# Patient Record
Sex: FEMALE | Race: WHITE | NOT HISPANIC OR LATINO | URBAN - NONMETROPOLITAN AREA
[De-identification: names, ages, dates, MRNs, and addresses within clinical notes are randomized per-mention and may not be internally consistent; named-entity substitution may affect disease eponyms.]

---

## 2017-03-08 PROBLEM — H52.4: Noted: 2017-03-08

## 2017-03-08 PROBLEM — H52.13: Noted: 2017-03-08

## 2019-04-03 ENCOUNTER — IMPORTED ENCOUNTER (OUTPATIENT)
Dept: URBAN - NONMETROPOLITAN AREA CLINIC 1 | Facility: CLINIC | Age: 49
End: 2019-04-03

## 2019-04-03 PROCEDURE — S0621 ROUTINE OPHTHALMOLOGICAL EXA: HCPCS

## 2019-04-03 PROCEDURE — 92310 CONTACT LENS FITTING OU: CPT

## 2019-04-03 NOTE — PATIENT DISCUSSION
Mylene Jeans /Sara OU- Discussed refractive status with patient- New glasses and CL Rx given today - Discussed with patient LASIK procedure and options- Continue to monitor- RTC 1 year complete; 's Notes: MR 4/3/19

## 2020-08-12 ENCOUNTER — IMPORTED ENCOUNTER (OUTPATIENT)
Dept: URBAN - NONMETROPOLITAN AREA CLINIC 1 | Facility: CLINIC | Age: 50
End: 2020-08-12

## 2020-08-12 PROCEDURE — 92310 CONTACT LENS FITTING OU: CPT

## 2020-08-12 PROCEDURE — S0621 ROUTINE OPHTHALMOLOGICAL EXA: HCPCS

## 2020-08-12 PROCEDURE — V2520 CONTACT LENS HYDROPHILIC: HCPCS

## 2020-08-12 NOTE — PATIENT DISCUSSION
Abram Tanner /Sara OU- Discussed refractive status with patient- New glasses and CL Rx given today - Discussed with patient LASIK procedure and options- Continue to monitor- RTC 1 year complete; 's Notes: MR 4/3/19

## 2021-09-30 ENCOUNTER — IMPORTED ENCOUNTER (OUTPATIENT)
Dept: URBAN - NONMETROPOLITAN AREA CLINIC 1 | Facility: CLINIC | Age: 51
End: 2021-09-30

## 2021-09-30 PROCEDURE — S0621 ROUTINE OPHTHALMOLOGICAL EXA: HCPCS

## 2021-09-30 PROCEDURE — 92310 CONTACT LENS FITTING OU: CPT

## 2021-09-30 NOTE — PATIENT DISCUSSION
Jacque Seed /Myoipa OU- Discussed refractive status with patient- New glasses and CL Rx given today - Continue to monitor- RTC 1 year complete; 's Notes: MR 4/3/19

## 2022-04-09 ASSESSMENT — VISUAL ACUITY
OS_CC: 20/25
OS_SC: 20/20
OU_CC: 20/20
OU_CC: 20/20-
OD_CC: 20/30-
OD_SC: 20/20-
OU_SC: 20/20
OS_SC: 20/20
OS_CC: 20/20
OU_SC: 20/20-
OD_SC: 20/20 FUZZY
OD_SC: 20/20-
OD_SC: 20/20
OS_CC: 20/30-
OS_SC: 20/30-
OU_SC: 20/20

## 2022-04-09 ASSESSMENT — TONOMETRY
OD_IOP_MMHG: 14
OS_IOP_MMHG: 14
OS_IOP_MMHG: 16
OS_IOP_MMHG: 14
OD_IOP_MMHG: 14
OD_IOP_MMHG: 14

## 2022-10-17 NOTE — PATIENT DISCUSSION
Advised patient to use preservative-free artificial tears 4-6x/day OU, warm compresses BID OU, and lid scrubs BID OU.

## 2023-01-11 ENCOUNTER — ESTABLISHED PATIENT (OUTPATIENT)
Dept: URBAN - NONMETROPOLITAN AREA CLINIC 1 | Facility: CLINIC | Age: 53
End: 2023-01-11

## 2023-01-11 DIAGNOSIS — H52.4: ICD-10-CM

## 2023-01-11 PROCEDURE — 92499OP2 OPTOMAP RETINAL SCREENING BOTH EYES

## 2023-01-11 PROCEDURE — 92015 DETERMINE REFRACTIVE STATE: CPT

## 2023-01-11 PROCEDURE — 92014 COMPRE OPH EXAM EST PT 1/>: CPT

## 2023-01-11 PROCEDURE — 92310-E CONTACT LENS FITTING ESTABLISH PATIENT

## 2023-01-11 ASSESSMENT — VISUAL ACUITY
OS_CC: 20/20
OD_CC: 20/20-2
OU_CC: 20/20

## 2023-01-11 ASSESSMENT — TONOMETRY
OD_IOP_MMHG: 16
OS_IOP_MMHG: 17

## 2024-01-15 ENCOUNTER — ESTABLISHED PATIENT (OUTPATIENT)
Dept: URBAN - NONMETROPOLITAN AREA CLINIC 1 | Facility: CLINIC | Age: 54
End: 2024-01-15

## 2024-01-15 DIAGNOSIS — H52.13: ICD-10-CM

## 2024-01-15 DIAGNOSIS — H52.4: ICD-10-CM

## 2024-01-15 PROCEDURE — 92310-E CONTACT LENS FITTING ESTABLISH PATIENT

## 2024-01-15 PROCEDURE — 92015 DETERMINE REFRACTIVE STATE: CPT

## 2024-01-15 PROCEDURE — 92014 COMPRE OPH EXAM EST PT 1/>: CPT

## 2024-01-15 ASSESSMENT — KERATOMETRY
OD_K1POWER_DIOPTERS: 44.75
OD_AXISANGLE2_DEGREES: 64
OD_AXISANGLE_DEGREES: 154
OD_K2POWER_DIOPTERS: 44.50
OS_K1POWER_DIOPTERS: 45.50
OS_AXISANGLE_DEGREES: 037
OS_K2POWER_DIOPTERS: 44.50
OS_AXISANGLE2_DEGREES: 127

## 2024-01-15 ASSESSMENT — VISUAL ACUITY
OU_CC: 20/20
OS_CC: 20/20
OD_CC: 20/25-1

## 2024-01-15 ASSESSMENT — TONOMETRY
OS_IOP_MMHG: 16
OD_IOP_MMHG: 16

## 2025-01-16 ENCOUNTER — COMPREHENSIVE EXAM (OUTPATIENT)
Age: 55
End: 2025-01-16

## 2025-01-16 DIAGNOSIS — H52.13: ICD-10-CM

## 2025-01-16 DIAGNOSIS — H52.4: ICD-10-CM

## 2025-01-16 PROCEDURE — 92310-1 LEVEL 1 SOFT LENS UPDATE

## 2025-01-16 PROCEDURE — 92015 DETERMINE REFRACTIVE STATE: CPT

## 2025-01-16 PROCEDURE — 92014 COMPRE OPH EXAM EST PT 1/>: CPT
